# Patient Record
Sex: MALE | Race: WHITE | NOT HISPANIC OR LATINO | Employment: UNEMPLOYED | URBAN - METROPOLITAN AREA
[De-identification: names, ages, dates, MRNs, and addresses within clinical notes are randomized per-mention and may not be internally consistent; named-entity substitution may affect disease eponyms.]

---

## 2019-07-05 ENCOUNTER — APPOINTMENT (OUTPATIENT)
Dept: GENERAL RADIOLOGY | Facility: HOSPITAL | Age: 29
End: 2019-07-05

## 2019-07-05 ENCOUNTER — HOSPITAL ENCOUNTER (EMERGENCY)
Facility: HOSPITAL | Age: 29
Discharge: HOME | End: 2019-07-05
Admitting: EMERGENCY MEDICINE

## 2019-07-05 VITALS
DIASTOLIC BLOOD PRESSURE: 70 MMHG | BODY MASS INDEX: 23.71 KG/M2 | WEIGHT: 160.05 LBS | TEMPERATURE: 97.6 F | SYSTOLIC BLOOD PRESSURE: 118 MMHG | RESPIRATION RATE: 16 BRPM | HEART RATE: 80 BPM | OXYGEN SATURATION: 98 % | HEIGHT: 69 IN

## 2019-07-05 DIAGNOSIS — S62.347A CLOSED NONDISPLACED FRACTURE OF BASE OF FIFTH METACARPAL BONE OF LEFT HAND, INITIAL ENCOUNTER: Primary | ICD-10-CM

## 2019-07-05 PROCEDURE — 73130 X-RAY EXAM OF HAND: CPT

## 2019-07-05 PROCEDURE — 99283 EMERGENCY DEPT VISIT LOW MDM: CPT

## 2019-07-05 RX ORDER — HYDROCODONE BITARTRATE AND ACETAMINOPHEN 5; 325 MG/1; MG/1
1 TABLET ORAL EVERY 6 HOURS PRN
Qty: 8 TABLET | Refills: 0 | Status: SHIPPED | OUTPATIENT
Start: 2019-07-05

## 2019-07-05 NOTE — ED PROVIDER NOTES
Subjective   History:  29-year-old male who presents to the ER with left hand pain.  He reports he was boxing with gloves on with his brother felt a sharp pain in his left hand. He reports swelling to the dorsal surface medial edge.     Onset: 1 day  Location: left hand  Duration: constant  Character: Sharp pain   Aggravating/Alleviating factors: None  Radiation: None  Severity: moderate              Review of Systems   Constitutional: Negative.    HENT: Negative.    Respiratory: Negative.    Cardiovascular: Negative.    Gastrointestinal: Negative.    Genitourinary: Negative.    Musculoskeletal:        Left hand pain    Skin: Negative.    Neurological: Negative.    Psychiatric/Behavioral: Negative.        No past medical history on file.    No Known Allergies    No past surgical history on file.    No family history on file.    Social History     Socioeconomic History   • Marital status: Single     Spouse name: Not on file   • Number of children: Not on file   • Years of education: Not on file   • Highest education level: Not on file   Substance and Sexual Activity   • Sexual activity: Yes     Partners: Female           Objective   Physical Exam   Constitutional: He is oriented to person, place, and time. He appears well-developed and well-nourished.   HENT:   Head: Normocephalic and atraumatic.   Eyes: Pupils are equal, round, and reactive to light.   Neck: Normal range of motion.   Pulmonary/Chest: Effort normal.   Musculoskeletal: Normal range of motion.   Pain to palpation over left dorsal surface of hand near medial edge. Increase in pain with finger flexion and extension. Increase in pain with rotation of wrist. Pulses 2+ No loss of sensation to hand    Neurological: He is alert and oriented to person, place, and time.   Skin: Skin is warm and dry.   Psychiatric: He has a normal mood and affect. His behavior is normal.       Splint - Cast - Strapping  Date/Time: 7/10/2019 11:51 PM  Performed by: Kaylan Irby  BERTO GLEASON  Authorized by: Kaylan Irby PA-C     Consent:     Consent obtained:  Verbal    Consent given by:  Patient  Pre-procedure details:     Sensation:  Normal  Procedure details:     Laterality:  Left    Location:  Hand    Hand:  L hand    Strapping: no      Splint type:  Long arm    Supplies:  Ortho-Glass  Post-procedure details:     Pain:  Improved    Sensation:  Normal    Patient tolerance of procedure:  Tolerated well, no immediate complications               ED Course      Xr Hand 3+ View Left    Result Date: 7/5/2019  Nondisplaced fracture of the fifth metacarpal bone.  Electronically Signed ByShe Santana On:7/5/2019 6:23 PM This report was finalized on [id number] by  Justin Santana, .    Labs Reviewed - No data to display  Medications - No data to display              MDM  Number of Diagnoses or Management Options  Closed nondisplaced fracture of base of fifth metacarpal bone of left hand, initial encounter:   Diagnosis management comments: DISPOSITION:   Chart Review:  Comorbidity: None reported  Differentials: acute fracture, sprain     ;this list is not all inclusive and does not constitute the entirety of considered causes  ECG:  Labs:  Imaging: Was interpreted by physician and reviewed by myself:  Xr Hand 3+ View Left    Result Date: 7/5/2019  Nondisplaced fracture of the fifth metacarpal bone.  Electronically Signed ByShe Santana On:7/5/2019 6:23 PM This report was finalized on [id number] by  Justin Santana, .    Disposition/Treatment:  29-year-old male who presents to the ER with left hand pain.  Patient has acute fracture.  He was placed in a short forearm splint.  He was discharged home.  He was given small prescription for Norco 5 mg he was stable at time of discharge.  Return precautions and follow-up injections were provided he was in agreement plan.       Amount and/or Complexity of Data Reviewed  Tests in the radiology section of CPT®: reviewed    Patient Progress  Patient progress:  stable        Final diagnoses:   Closed nondisplaced fracture of base of fifth metacarpal bone of left hand, initial encounter            Kaylan Irby PA-C  07/05/19 1844       Kaylan Irby PA-C  07/10/19 3022

## 2021-02-03 ENCOUNTER — HOSPITAL ENCOUNTER (EMERGENCY)
Facility: HOSPITAL | Age: 31
Discharge: HOME | End: 2021-02-03
Admitting: EMERGENCY MEDICINE

## 2021-02-03 ENCOUNTER — APPOINTMENT (OUTPATIENT)
Dept: GENERAL RADIOLOGY | Facility: HOSPITAL | Age: 31
End: 2021-02-03

## 2021-02-03 VITALS
DIASTOLIC BLOOD PRESSURE: 80 MMHG | BODY MASS INDEX: 23.7 KG/M2 | OXYGEN SATURATION: 100 % | WEIGHT: 160 LBS | HEIGHT: 69 IN | HEART RATE: 74 BPM | RESPIRATION RATE: 16 BRPM | TEMPERATURE: 98.1 F | SYSTOLIC BLOOD PRESSURE: 120 MMHG

## 2021-02-03 DIAGNOSIS — Z20.822 COVID-19 RULED OUT BY LABORATORY TESTING: ICD-10-CM

## 2021-02-03 DIAGNOSIS — F41.9 ANXIETY: Primary | ICD-10-CM

## 2021-02-03 DIAGNOSIS — R07.89 CHEST WALL PAIN: ICD-10-CM

## 2021-02-03 LAB — SARS-COV-2 RNA PNL SPEC NAA+PROBE: NORMAL

## 2021-02-03 PROCEDURE — 87635 SARS-COV-2 COVID-19 AMP PRB: CPT | Performed by: NURSE PRACTITIONER

## 2021-02-03 PROCEDURE — 71045 X-RAY EXAM CHEST 1 VIEW: CPT

## 2021-02-03 PROCEDURE — 99283 EMERGENCY DEPT VISIT LOW MDM: CPT

## 2021-02-03 RX ORDER — DICLOFENAC SODIUM 75 MG/1
75 TABLET, DELAYED RELEASE ORAL 2 TIMES DAILY PRN
Qty: 20 TABLET | Refills: 0 | Status: SHIPPED | OUTPATIENT
Start: 2021-02-03

## 2021-02-03 NOTE — DISCHARGE INSTRUCTIONS
Push clear liquids    Use diclofenac as needed for inflammation and pain do not mix with Motrin ibuprofen Advil or Aleve    Follow-up with primary care for any further problems or return if worse    Your Covid was negative

## 2021-02-03 NOTE — ED PROVIDER NOTES
Subjective   Patient is a 30-year-old gentleman who states he had a panic attack at home and his mother told him to get in the car he is not wearing any shoes.  He has rambling thoughts and states that he traveled from [facility] today he also complains of some left posterior chest wall and lung pain he states that he has had no cough or congestion he states he does have some shortness of breath and states that he had Covid exposure about a week ago.  He states his exposure was his brother who was visiting at the time.  He denies nausea or vomiting he rates his pain a 4/10.  States it is worse with deep breath          Review of Systems   Constitutional: Negative for chills, fatigue and fever.   HENT: Negative for congestion, tinnitus and trouble swallowing.    Eyes: Negative for photophobia, discharge and redness.   Respiratory: Positive for shortness of breath. Negative for cough.    Cardiovascular: Negative for chest pain and palpitations.   Gastrointestinal: Negative for abdominal pain, diarrhea, nausea and vomiting.   Genitourinary: Negative for dysuria, frequency and urgency.   Musculoskeletal: Negative for back pain, joint swelling and myalgias.   Skin: Negative for rash.   Neurological: Negative for dizziness and headaches.   Psychiatric/Behavioral: Negative for confusion and suicidal ideas. The patient is nervous/anxious.    All other systems reviewed and are negative.      No past medical history on file.    No Known Allergies    No past surgical history on file.    No family history on file.    Social History     Socioeconomic History   • Marital status: Single     Spouse name: Not on file   • Number of children: Not on file   • Years of education: Not on file   • Highest education level: Not on file   Substance and Sexual Activity   • Sexual activity: Yes     Partners: Female           Objective   Physical Exam  Vitals signs reviewed.   Constitutional:       Appearance: He is well-developed.  "  HENT:      Head: Normocephalic and atraumatic.      Right Ear: External ear normal.      Left Ear: External ear normal.      Nose: Nose normal.      Mouth/Throat:      Mouth: Mucous membranes are moist.      Pharynx: Oropharynx is clear.   Eyes:      Conjunctiva/sclera: Conjunctivae normal.      Pupils: Pupils are equal, round, and reactive to light.   Neck:      Musculoskeletal: Normal range of motion and neck supple.   Cardiovascular:      Rate and Rhythm: Normal rate and regular rhythm.      Pulses: Normal pulses.      Heart sounds: Normal heart sounds.   Pulmonary:      Effort: Pulmonary effort is normal.      Breath sounds: Normal breath sounds.   Abdominal:      General: Abdomen is flat. Bowel sounds are normal.      Palpations: Abdomen is soft.   Musculoskeletal: Normal range of motion.   Skin:     General: Skin is warm and dry.      Capillary Refill: Capillary refill takes 2 to 3 seconds.   Neurological:      General: No focal deficit present.      Mental Status: He is alert and oriented to person, place, and time. Mental status is at baseline.      GCS: GCS eye subscore is 4. GCS verbal subscore is 5. GCS motor subscore is 6.   Psychiatric:         Attention and Perception: Attention normal.         Mood and Affect: Mood is anxious.         Speech: Speech is rapid and pressured.         Behavior: Behavior is cooperative.         Procedures           ED Course      /73 (BP Location: Left arm, Patient Position: Sitting)   Pulse 88   Temp 97.5 °F (36.4 °C) (Oral)   Resp 20   Ht 175.3 cm (69\")   Wt 72.6 kg (160 lb)   SpO2 99%   BMI 23.63 kg/m²   Labs Reviewed   COVID-19,ABBOTT IN-HOUSE,NASAL SWAB (NO TRANSPORT MEDIA) 2 HR TAT - Normal    Narrative:     Fact sheet for providers: https://www.fda.gov/media/674811/download     Fact sheet for patients: https://www.fda.gov/media/552014/download    Test performed by PCR.  If inconsistent with clinical signs and symptoms patient should be tested with " different authorized molecular test.     Medications - No data to display    Chest x-ray reviewed by myself read by Dr. Mcclellan is negative.                                     MDM  Number of Diagnoses or Management Options  Diagnosis management comments: Patient had above exam and chest x-ray was found to be within normal limits.       Amount and/or Complexity of Data Reviewed  Tests in the radiology section of CPT®: reviewed  Independent visualization of images, tracings, or specimens: yes        Final diagnoses:   Anxiety   Chest wall pain   COVID-19 ruled out by laboratory testing            Melody Fontana, APRN  02/03/21 0316

## 2021-02-03 NOTE — ED NOTES
"Pt states that he has been having a \"tightness\" in his chest for some time now. He has complaints of a tightness more on the LEFT side today.    No distress noted at this time. Call light within reach. Will continue to monitor     Jessica Ward LPN  02/03/21 0239    "

## 2024-05-15 ENCOUNTER — HOSPITAL ENCOUNTER (EMERGENCY)
Facility: HOSPITAL | Age: 34
Discharge: HOME | End: 2024-05-15
Attending: EMERGENCY MEDICINE
Payer: MEDICAID

## 2024-05-15 VITALS
WEIGHT: 175 LBS | HEIGHT: 69 IN | OXYGEN SATURATION: 94 % | SYSTOLIC BLOOD PRESSURE: 112 MMHG | BODY MASS INDEX: 25.92 KG/M2 | TEMPERATURE: 97.8 F | DIASTOLIC BLOOD PRESSURE: 67 MMHG | RESPIRATION RATE: 20 BRPM | HEART RATE: 84 BPM

## 2024-05-15 DIAGNOSIS — M79.10 MYALGIA: Primary | ICD-10-CM

## 2024-05-15 DIAGNOSIS — R20.2 PARESTHESIAS: ICD-10-CM

## 2024-05-15 LAB
ANION GAP SERPL CALCULATED.3IONS-SCNC: 11 MMOL/L (ref 5–15)
BASOPHILS # BLD AUTO: 0.03 10*3/MM3 (ref 0–0.2)
BASOPHILS NFR BLD AUTO: 0.4 % (ref 0–1.5)
BUN SERPL-MCNC: 9 MG/DL (ref 6–20)
BUN/CREAT SERPL: 11 (ref 7–25)
CALCIUM SPEC-SCNC: 9.4 MG/DL (ref 8.6–10.5)
CHLORIDE SERPL-SCNC: 107 MMOL/L (ref 98–107)
CO2 SERPL-SCNC: 24 MMOL/L (ref 22–29)
CREAT SERPL-MCNC: 0.82 MG/DL (ref 0.76–1.27)
DEPRECATED RDW RBC AUTO: 41.7 FL (ref 37–54)
EGFRCR SERPLBLD CKD-EPI 2021: 119.7 ML/MIN/1.73
EOSINOPHIL # BLD AUTO: 0.02 10*3/MM3 (ref 0–0.4)
EOSINOPHIL NFR BLD AUTO: 0.3 % (ref 0.3–6.2)
ERYTHROCYTE [DISTWIDTH] IN BLOOD BY AUTOMATED COUNT: 12.1 % (ref 12.3–15.4)
GLUCOSE SERPL-MCNC: 99 MG/DL (ref 65–99)
HCT VFR BLD AUTO: 45.8 % (ref 37.5–51)
HGB BLD-MCNC: 15.1 G/DL (ref 13–17.7)
HOLD SPECIMEN: NORMAL
IMM GRANULOCYTES # BLD AUTO: 0.03 10*3/MM3 (ref 0–0.05)
IMM GRANULOCYTES NFR BLD AUTO: 0.4 % (ref 0–0.5)
LYMPHOCYTES # BLD AUTO: 1.56 10*3/MM3 (ref 0.7–3.1)
LYMPHOCYTES NFR BLD AUTO: 19.6 % (ref 19.6–45.3)
MCH RBC QN AUTO: 30.6 PG (ref 26.6–33)
MCHC RBC AUTO-ENTMCNC: 33 G/DL (ref 31.5–35.7)
MCV RBC AUTO: 92.9 FL (ref 79–97)
MONOCYTES # BLD AUTO: 0.51 10*3/MM3 (ref 0.1–0.9)
MONOCYTES NFR BLD AUTO: 6.4 % (ref 5–12)
NEUTROPHILS NFR BLD AUTO: 5.79 10*3/MM3 (ref 1.7–7)
NEUTROPHILS NFR BLD AUTO: 72.9 % (ref 42.7–76)
NRBC BLD AUTO-RTO: 0 /100 WBC (ref 0–0.2)
PLATELET # BLD AUTO: 284 10*3/MM3 (ref 140–450)
PMV BLD AUTO: 9 FL (ref 6–12)
POTASSIUM SERPL-SCNC: 3.7 MMOL/L (ref 3.5–5.2)
RBC # BLD AUTO: 4.93 10*6/MM3 (ref 4.14–5.8)
SODIUM SERPL-SCNC: 142 MMOL/L (ref 136–145)
TSH SERPL DL<=0.05 MIU/L-ACNC: 1.57 UIU/ML (ref 0.27–4.2)
WBC NRBC COR # BLD AUTO: 7.94 10*3/MM3 (ref 3.4–10.8)
WHOLE BLOOD HOLD COAG: NORMAL

## 2024-05-15 PROCEDURE — 99283 EMERGENCY DEPT VISIT LOW MDM: CPT

## 2024-05-15 PROCEDURE — 84443 ASSAY THYROID STIM HORMONE: CPT | Performed by: EMERGENCY MEDICINE

## 2024-05-15 PROCEDURE — 85025 COMPLETE CBC W/AUTO DIFF WBC: CPT | Performed by: EMERGENCY MEDICINE

## 2024-05-15 PROCEDURE — 80048 BASIC METABOLIC PNL TOTAL CA: CPT | Performed by: EMERGENCY MEDICINE

## 2024-05-15 RX ORDER — SODIUM CHLORIDE 0.9 % (FLUSH) 0.9 %
10 SYRINGE (ML) INJECTION AS NEEDED
Status: DISCONTINUED | OUTPATIENT
Start: 2024-05-15 | End: 2024-05-15 | Stop reason: HOSPADM

## 2024-05-15 RX ORDER — NAPROXEN 375 MG/1
375 TABLET ORAL 2 TIMES DAILY PRN
Qty: 14 TABLET | Refills: 0 | Status: SHIPPED | OUTPATIENT
Start: 2024-05-15

## 2024-05-15 NOTE — ED PROVIDER NOTES
Subjective   History of Present Illness  Patient is a 34-year-old male complaining of achiness in both legs for the past several hours.  Also complains of tingling to both arms and both legs.  Denies headache cough fever chest pain shortness of breath or other complaint      Review of Systems    No past medical history on file.    No Known Allergies    No past surgical history on file.    No family history on file.    Social History     Socioeconomic History    Marital status: Single   Substance and Sexual Activity    Sexual activity: Yes     Partners: Female           Objective   Physical Exam  Neurologic exam is nonfocal.  Neck has no adenopathy JVD or bruits.  Lungs are clear.  Heart has regular rate rhythm without murmur rub or gallop.  Chest is nontender.  Abdomen soft.  Extremity exam unremarkable.  Procedures           ED Course      Results for orders placed or performed during the hospital encounter of 05/15/24   Basic Metabolic Panel    Specimen: Blood   Result Value Ref Range    Glucose 99 65 - 99 mg/dL    BUN 9 6 - 20 mg/dL    Creatinine 0.82 0.76 - 1.27 mg/dL    Sodium 142 136 - 145 mmol/L    Potassium 3.7 3.5 - 5.2 mmol/L    Chloride 107 98 - 107 mmol/L    CO2 24.0 22.0 - 29.0 mmol/L    Calcium 9.4 8.6 - 10.5 mg/dL    BUN/Creatinine Ratio 11.0 7.0 - 25.0    Anion Gap 11.0 5.0 - 15.0 mmol/L    eGFR 119.7 >60.0 mL/min/1.73   TSH    Specimen: Blood   Result Value Ref Range    TSH 1.570 0.270 - 4.200 uIU/mL   CBC Auto Differential    Specimen: Blood   Result Value Ref Range    WBC 7.94 3.40 - 10.80 10*3/mm3    RBC 4.93 4.14 - 5.80 10*6/mm3    Hemoglobin 15.1 13.0 - 17.7 g/dL    Hematocrit 45.8 37.5 - 51.0 %    MCV 92.9 79.0 - 97.0 fL    MCH 30.6 26.6 - 33.0 pg    MCHC 33.0 31.5 - 35.7 g/dL    RDW 12.1 (L) 12.3 - 15.4 %    RDW-SD 41.7 37.0 - 54.0 fl    MPV 9.0 6.0 - 12.0 fL    Platelets 284 140 - 450 10*3/mm3    Neutrophil % 72.9 42.7 - 76.0 %    Lymphocyte % 19.6 19.6 - 45.3 %    Monocyte % 6.4 5.0 - 12.0  %    Eosinophil % 0.3 0.3 - 6.2 %    Basophil % 0.4 0.0 - 1.5 %    Immature Grans % 0.4 0.0 - 0.5 %    Neutrophils, Absolute 5.79 1.70 - 7.00 10*3/mm3    Lymphocytes, Absolute 1.56 0.70 - 3.10 10*3/mm3    Monocytes, Absolute 0.51 0.10 - 0.90 10*3/mm3    Eosinophils, Absolute 0.02 0.00 - 0.40 10*3/mm3    Basophils, Absolute 0.03 0.00 - 0.20 10*3/mm3    Immature Grans, Absolute 0.03 0.00 - 0.05 10*3/mm3    nRBC 0.0 0.0 - 0.2 /100 WBC   Gold Top - SST   Result Value Ref Range    Extra Tube Hold for add-ons.    Light Blue Top   Result Value Ref Range    Extra Tube Hold for add-ons.      No radiology results for the last day                                           Medical Decision Making  BMP shows no renal insufficiency or electrolyte abnormality.  TSH is normal.  Patient has no leukocytosis no left shift and no anemia.  Patient will be discharged.  Will place on Naprosyn.  I will see his MD for recheck as needed.    Amount and/or Complexity of Data Reviewed  Labs: ordered. Decision-making details documented in ED Course.    Risk  Prescription drug management.        Final diagnoses:   Myalgia   Paresthesias       ED Disposition  ED Disposition       ED Disposition   Discharge    Condition   Stable    Comment   --               No follow-up provider specified.       Medication List        New Prescriptions      naproxen 375 MG tablet  Commonly known as: NAPROSYN  Take 1 tablet by mouth 2 (Two) Times a Day As Needed for Moderate Pain.               Where to Get Your Medications        Information about where to get these medications is not yet available    Ask your nurse or doctor about these medications  naproxen 375 MG tablet            Jaspreet Guzman MD  05/15/24 0272

## 2024-08-23 ENCOUNTER — TELEPHONE (OUTPATIENT)
Dept: URGENT CARE | Facility: CLINIC | Age: 34
End: 2024-08-23

## 2024-08-23 DIAGNOSIS — Z20.2 TRICHOMONAS EXPOSURE: Primary | ICD-10-CM

## 2024-08-23 PROCEDURE — 87491 CHLMYD TRACH DNA AMP PROBE: CPT | Performed by: NURSE PRACTITIONER

## 2024-08-23 PROCEDURE — 87591 N.GONORRHOEAE DNA AMP PROB: CPT | Performed by: NURSE PRACTITIONER

## 2024-08-23 PROCEDURE — 87661 TRICHOMONAS VAGINALIS AMPLIF: CPT | Performed by: NURSE PRACTITIONER

## 2024-08-23 RX ORDER — METRONIDAZOLE 500 MG/1
500 TABLET ORAL 2 TIMES DAILY
Qty: 14 TABLET | Refills: 0 | Status: SHIPPED | OUTPATIENT
Start: 2024-08-23 | End: 2024-08-30

## 2024-08-23 NOTE — TELEPHONE ENCOUNTER
Chlamydia positive. Gonorrhea and trichomonas negative. His partner had also positive trichomonas test as well so I will send in [facility] to treat him as well.